# Patient Record
Sex: MALE | Race: OTHER | URBAN - METROPOLITAN AREA
[De-identification: names, ages, dates, MRNs, and addresses within clinical notes are randomized per-mention and may not be internally consistent; named-entity substitution may affect disease eponyms.]

---

## 2025-04-24 ENCOUNTER — OFFICE VISIT (OUTPATIENT)
Dept: URGENT CARE | Age: 35
End: 2025-04-24
Payer: COMMERCIAL

## 2025-04-24 VITALS
HEIGHT: 72 IN | BODY MASS INDEX: 30.48 KG/M2 | WEIGHT: 225 LBS | TEMPERATURE: 98.5 F | RESPIRATION RATE: 18 BRPM | OXYGEN SATURATION: 98 % | HEART RATE: 83 BPM | DIASTOLIC BLOOD PRESSURE: 88 MMHG | SYSTOLIC BLOOD PRESSURE: 138 MMHG

## 2025-04-24 DIAGNOSIS — H57.89 EYE SWELLING, RIGHT: ICD-10-CM

## 2025-04-24 DIAGNOSIS — H00.021 HORDEOLUM INTERNUM OF RIGHT UPPER EYELID: Primary | ICD-10-CM

## 2025-04-24 RX ORDER — TRAZODONE HYDROCHLORIDE 50 MG/1
50 TABLET ORAL DAILY PRN
COMMUNITY
Start: 2025-04-09

## 2025-04-24 RX ORDER — DOXYCYCLINE 100 MG/1
100 CAPSULE ORAL 2 TIMES DAILY
Qty: 20 CAPSULE | Refills: 0 | Status: SHIPPED | OUTPATIENT
Start: 2025-04-24 | End: 2025-05-04

## 2025-04-24 RX ORDER — DEXTROAMPHETAMINE SACCHARATE, AMPHETAMINE ASPARTATE MONOHYDRATE, DEXTROAMPHETAMINE SULFATE AND AMPHETAMINE SULFATE 5; 5; 5; 5 MG/1; MG/1; MG/1; MG/1
20 CAPSULE, EXTENDED RELEASE ORAL
COMMUNITY
Start: 2025-03-06 | End: 2025-05-09

## 2025-04-24 RX ORDER — TOBRAMYCIN 3 MG/ML
1-2 SOLUTION/ DROPS OPHTHALMIC EVERY 4 HOURS
Qty: 5 ML | Refills: 0 | Status: SHIPPED | OUTPATIENT
Start: 2025-04-24 | End: 2025-05-01

## 2025-04-24 ASSESSMENT — VISUAL ACUITY: OU: 1

## 2025-04-24 ASSESSMENT — PAIN SCALES - GENERAL: PAINLEVEL_OUTOF10: 2

## 2025-04-24 NOTE — PROGRESS NOTES
Subjective   Patient ID: Tomi Pruitt is a 34 y.o. male. He presents today with a chief complaint of Eye Problem (1-2 weeks/Right eye swelling / crusting / itching/ ). Patient presents with a 2 week history of gradually increasing right upper eyelid swelling. He also developed eye redness, irritation and discharge over the past 2 days. No visual changes are reported. He does not wear contacts.     History of Present Illness    Eye Problem      Past Medical History  Allergies as of 04/24/2025    (No Known Allergies)       Prescriptions Prior to Admission[1]     Medical History[2]    Surgical History[3]     reports that he has never smoked. He has never used smokeless tobacco.    Review of Systems  Review of Systems                               Objective    Vitals:    04/24/25 1843   BP: (!) 146/100   BP Location: Right arm   Patient Position: Sitting   BP Cuff Size: Adult   Pulse: 83   Resp: 18   Temp: 36.9 °C (98.5 °F)   TempSrc: Oral   SpO2: 98%   Weight: 102 kg (225 lb)   Height: 1.829 m (6')     No LMP for male patient.    Physical Exam  Vitals and nursing note reviewed.   Constitutional:       General: He is not in acute distress.     Appearance: Normal appearance. He is not toxic-appearing.   HENT:      Nose: Nose normal.      Mouth/Throat:      Mouth: Mucous membranes are moist.      Pharynx: Oropharynx is clear.   Eyes:      General: Lids are everted, no foreign bodies appreciated. Vision grossly intact. Gaze aligned appropriately. No visual field deficit or scleral icterus.        Right eye: No discharge.      Extraocular Movements: Extraocular movements intact.      Conjunctiva/sclera:      Right eye: Right conjunctiva is injected. Exudate present.      Pupils: Pupils are equal, round, and reactive to light.      Comments: Internal hordeolum, right upper eyelid   Musculoskeletal:      Cervical back: Normal range of motion. No rigidity.   Lymphadenopathy:      Cervical: No cervical adenopathy.    Neurological:      Mental Status: He is alert.         Procedures    Point of Care Test & Imaging Results from this visit  No results found for this visit on 04/24/25.   Imaging  No results found.    Cardiology, Vascular, and Other Imaging  No other imaging results found for the past 2 days      Diagnostic study results (if any) were reviewed by Homa Mar MD.    Assessment/Plan   Allergies, medications, history, and pertinent labs/EKGs/Imaging reviewed by Homa Mar MD.     Medical Decision Making      Orders and Diagnoses  Diagnoses and all orders for this visit:  Eye swelling, right  -     Visual acuity screening      Medical Admin Record      Patient disposition: Home    Electronically signed by Homa Mar MD  6:52 PM           [1] (Not in a hospital admission)  [2] History reviewed. No pertinent past medical history.  [3] History reviewed. No pertinent surgical history.

## 2025-04-24 NOTE — PATIENT INSTRUCTIONS
Antibiotics as directed; take with food  Eye drops as directed  Cool compresses as directed  Follow up with new or worsening symptoms

## 2025-05-24 ENCOUNTER — OFFICE VISIT (OUTPATIENT)
Dept: URGENT CARE | Age: 35
End: 2025-05-24
Payer: COMMERCIAL

## 2025-05-24 VITALS
DIASTOLIC BLOOD PRESSURE: 90 MMHG | RESPIRATION RATE: 16 BRPM | TEMPERATURE: 98.1 F | SYSTOLIC BLOOD PRESSURE: 143 MMHG | OXYGEN SATURATION: 99 % | HEART RATE: 81 BPM

## 2025-05-24 DIAGNOSIS — H02.843 EYELID GLAND SWELLING, RIGHT: Primary | ICD-10-CM

## 2025-05-24 RX ORDER — KETOROLAC TROMETHAMINE 4 MG/ML
1 SOLUTION/ DROPS OPHTHALMIC 4 TIMES DAILY
Qty: 5 ML | Refills: 0 | Status: SHIPPED | OUTPATIENT
Start: 2025-05-24

## 2025-05-24 RX ORDER — OFLOXACIN 3 MG/ML
1 SOLUTION/ DROPS OPHTHALMIC 4 TIMES DAILY
Qty: 10 ML | Refills: 0 | Status: SHIPPED | OUTPATIENT
Start: 2025-05-24 | End: 2025-06-03

## 2025-05-24 ASSESSMENT — ENCOUNTER SYMPTOMS
FATIGUE: 0
NAUSEA: 0
JOINT SWELLING: 0
EYE REDNESS: 1
VOMITING: 0
COUGH: 0
EYE PAIN: 0
PHOTOPHOBIA: 0
CHILLS: 0
DIARRHEA: 0
ABDOMINAL PAIN: 0
FEVER: 0
RHINORRHEA: 0
CHEST TIGHTNESS: 0
SORE THROAT: 0
SINUS PAIN: 0
EYE ITCHING: 1
EYE DISCHARGE: 1
ARTHRALGIAS: 0
SHORTNESS OF BREATH: 0

## 2025-05-24 ASSESSMENT — VISUAL ACUITY: OU: 1

## 2025-05-24 NOTE — PROGRESS NOTES
Subjective   Patient ID: Tomi Pruitt is a 34 y.o. male. They present today with a chief complaint of Eye Problem (Rt eye red, swollen).    History of Present Illness  Pt presented with lingering eye symptoms. Reports eye redness, crusty eyes and discharge. Symptoms started one month ago. He did seek medical attention at [facility] and treated as internal hordeolum. Reports symptoms improved a little bit and worsen again. He was trying to get an appt with eye doctor but has to wait long. Pt denies injury to eye and does not wear contact lens.       Eye Problem  Associated symptoms: discharge, itching and redness    Associated symptoms: no nausea, no photophobia and no vomiting        Past Medical History  Allergies as of 05/24/2025    (No Known Allergies)       Prescriptions Prior to Admission[1]     Medical History[2]    Surgical History[3]     reports that he has never smoked. He has never used smokeless tobacco.    Review of Systems  Review of Systems   Constitutional:  Negative for chills, fatigue and fever.   HENT:  Negative for ear discharge, ear pain, rhinorrhea, sinus pain, sneezing and sore throat.    Eyes:  Positive for discharge, redness and itching. Negative for photophobia, pain and visual disturbance.   Respiratory:  Negative for cough, chest tightness and shortness of breath.    Cardiovascular:  Negative for chest pain.   Gastrointestinal:  Negative for abdominal pain, diarrhea, nausea and vomiting.   Musculoskeletal:  Negative for arthralgias and joint swelling.   Skin:  Negative for rash.                                  Objective    Vitals:    05/24/25 1542   BP: 143/90   Pulse: 81   Resp: 16   Temp: 36.7 °C (98.1 °F)   SpO2: 99%     No LMP for male patient.    Physical Exam  Constitutional:       Appearance: Normal appearance.   HENT:      Head: Normocephalic and atraumatic.      Nose: No congestion or rhinorrhea.   Eyes:      General: Vision grossly intact.         Right eye: No foreign body.          Left eye: No foreign body.      Extraocular Movements: Extraocular movements intact.      Conjunctiva/sclera:      Right eye: Right conjunctiva is injected. No hemorrhage.     Left eye: Left conjunctiva is not injected. No hemorrhage.     Pupils: Pupils are equal, round, and reactive to light.        Comments: No swelling or redness around the eye   Cardiovascular:      Rate and Rhythm: Normal rate and regular rhythm.      Heart sounds: No murmur heard.  Pulmonary:      Effort: Pulmonary effort is normal.   Lymphadenopathy:      Cervical: No cervical adenopathy.   Neurological:      Mental Status: He is alert and oriented to person, place, and time.   Psychiatric:         Mood and Affect: Mood normal.         Procedures    Point of Care Test & Imaging Results from this visit  No results found for this visit on 05/24/25.   Imaging  No results found.    Cardiology, Vascular, and Other Imaging  No other imaging results found for the past 2 days      Diagnostic study results (if any) were reviewed by Kelsea Kaiser PA-C.    Assessment/Plan   Allergies, medications, history, and pertinent labs/EKGs/Imaging reviewed by Kelsea Kaiser PA-C.     Medical Decision Making  Swelling of the right upper inner eyelid around the outer corner of the eye and part of the soft tissue cover the eyeball but can be retracted  Mild right conjunctival injection due to irritation   Will treat with antibiotics and NSAIDS and referred to ophthalmologist due to concerns for duration of symptoms    Orders and Diagnoses  Diagnoses and all orders for this visit:  Eyelid gland swelling, right  -     ofloxacin (Ocuflox) 0.3 % ophthalmic solution; Administer 1 drop into the left eye 4 times a day for 10 days.  -     ketorolac (Acular) 0.4 % ophthalmic solution; Administer 1 drop into the left eye 4 times a day.  -     Referral to Ophthalmology; Future      Medical Admin Record      Patient disposition: Home    Electronically signed by Kelsea Kaiser PA-C  5:05  PM           [1] (Not in a hospital admission)   [2] History reviewed. No pertinent past medical history.  [3] History reviewed. No pertinent surgical history.